# Patient Record
Sex: FEMALE | HISPANIC OR LATINO | URBAN - METROPOLITAN AREA
[De-identification: names, ages, dates, MRNs, and addresses within clinical notes are randomized per-mention and may not be internally consistent; named-entity substitution may affect disease eponyms.]

---

## 2021-08-23 ENCOUNTER — APPOINTMENT (RX ONLY)
Dept: URBAN - METROPOLITAN AREA CLINIC 12 | Facility: CLINIC | Age: 41
Setting detail: DERMATOLOGY
End: 2021-08-23

## 2021-08-23 DIAGNOSIS — Z41.1 ENCOUNTER FOR COSMETIC SURGERY: ICD-10-CM

## 2021-08-23 DIAGNOSIS — C49 MALIGNANT NEOPLASM OF OTHER CONNECTIVE AND SOFT TISSUE: ICD-10-CM

## 2021-08-23 PROBLEM — C49.9 MALIGNANT NEOPLASM OF CONNECTIVE AND SOFT TISSUE, UNSPECIFIED: Status: ACTIVE | Noted: 2021-08-23

## 2021-08-23 PROCEDURE — ? COUNSELING - LEOIMYOSARCOMA

## 2021-08-23 PROCEDURE — 99203 OFFICE O/P NEW LOW 30 MIN: CPT

## 2021-08-23 PROCEDURE — ? PHOTOS OBTAINED

## 2021-08-23 PROCEDURE — ? PATIENT SPECIFIC COUNSELING

## 2021-08-23 ASSESSMENT — LOCATION DETAILED DESCRIPTION DERM
LOCATION DETAILED: LEFT SUPRAPUBIC SKIN
LOCATION DETAILED: LEFT SUPRAPUBIC SKIN

## 2021-08-23 ASSESSMENT — LOCATION ZONE DERM
LOCATION ZONE: TRUNK
LOCATION ZONE: TRUNK

## 2021-08-23 ASSESSMENT — LOCATION SIMPLE DESCRIPTION DERM
LOCATION SIMPLE: GROIN
LOCATION SIMPLE: GROIN

## 2021-08-23 NOTE — PROCEDURE: PHOTOS OBTAINED
Photographed Locations: Photos were obtained of features
Follow-Up For Additional Photos?: yes
Detail Level: Detailed

## 2021-08-23 NOTE — PROCEDURE: PATIENT SPECIFIC COUNSELING
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 Patient was referred by Dr. Mike Talbot. Patient noticed a small bump about 1 year ago. 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 History of chemotherapy which was completed, patient will finish radiation therapy in September 2021. \\n\\Sheree Bishop examined patient and reports Sarcoma is larger, dimensions are 20cm x 20cm. explained he will coordinate with Dr. Talbot with the surgical procedure. Informed patient that he does not know exactly how he will perform the procedure until he sees the area of the deficit. Noted if Dr. Talbot removed Fascia and Muscle then he will need to use a mesh the reconstruct her abdomen. Dr. Bishop informed patients reconstruction will be on the same day as tumor removal in the O.R. Under general anesthesia. Dr. Bishop stepped out and Spoke to Dr. Talbot, reports needs to determine if he’s removing muscle and will review CT scan in a few weeks, noted surgery will take place in October, advised patient to follow up in mid-late sept to reevaluation for the closure, reconstruction and Pre-Op. patient verbalized understanding.
Detail Level: Simple

## 2021-08-23 NOTE — HPI: SOFT TISSUE SARCOMA
Is This A New Presentation, Or A Follow-Up?: Soft Tissue Sarcoma
Scale Of 0 To 10?: 0
Name Of General Surgeon?: DR. Mike Talbot

## 2021-09-20 ENCOUNTER — APPOINTMENT (RX ONLY)
Dept: URBAN - METROPOLITAN AREA CLINIC 12 | Facility: CLINIC | Age: 41
Setting detail: DERMATOLOGY
End: 2021-09-20

## 2021-09-20 DIAGNOSIS — C49 MALIGNANT NEOPLASM OF OTHER CONNECTIVE AND SOFT TISSUE: ICD-10-CM

## 2021-09-20 PROBLEM — C49.9 MALIGNANT NEOPLASM OF CONNECTIVE AND SOFT TISSUE, UNSPECIFIED: Status: ACTIVE | Noted: 2021-09-20

## 2021-09-20 PROCEDURE — ? PRE-OP WORKLIST

## 2021-09-20 PROCEDURE — 99214 OFFICE O/P EST MOD 30 MIN: CPT

## 2021-09-20 PROCEDURE — ? PATIENT SPECIFIC COUNSELING

## 2021-09-20 ASSESSMENT — LOCATION ZONE DERM
LOCATION ZONE: TRUNK
LOCATION ZONE: TRUNK

## 2021-09-20 ASSESSMENT — LOCATION SIMPLE DESCRIPTION DERM
LOCATION SIMPLE: GROIN
LOCATION SIMPLE: GROIN

## 2021-09-20 ASSESSMENT — LOCATION DETAILED DESCRIPTION DERM
LOCATION DETAILED: LEFT SUPRAPUBIC SKIN
LOCATION DETAILED: LEFT SUPRAPUBIC SKIN

## 2021-09-20 NOTE — PROCEDURE: PRE-OP WORKLIST
Surgeon: Dr. Bishop
Surgery Scheduled: Abdominal reconstruction with local flap +/- STSG
Date Of Surgery: TBD
Photos Taken?: yes
Detail Level: Simple

## 2021-09-20 NOTE — PROCEDURE: PATIENT SPECIFIC COUNSELING
Detail Level: Zone
Other (Free Text): Dr. Bishop stepped in evaluated the tumor explained that he may be able to use some of the loos skin on her abdomen, however, due to her tummy tuck there may be some restrictions. So the second option would be to take muscle from her thigh to help close the defect. Explained that he may be able to use the muscle from her inner thigh, however, there may not be enough muscle in that area to fill the defect.  explained that he will not know exactly what he will do until presented with the defect. Patient was made aware that she will go home with a wound vac. Patient will need to stay in the hospital 3-4 days

## 2021-10-19 ENCOUNTER — APPOINTMENT (RX ONLY)
Dept: URBAN - METROPOLITAN AREA CLINIC 12 | Facility: CLINIC | Age: 41
Setting detail: DERMATOLOGY
End: 2021-10-19

## 2021-10-19 DIAGNOSIS — Z48.89 ENCOUNTER FOR OTHER SPECIFIED SURGICAL AFTERCARE: ICD-10-CM

## 2021-10-19 PROCEDURE — 99024 POSTOP FOLLOW-UP VISIT: CPT

## 2021-10-19 PROCEDURE — ? DRESSING CHANGE

## 2021-10-19 PROCEDURE — ? POST-OP CHECK

## 2021-10-19 PROCEDURE — ? COUNSELING - POST-OP CHECK, COMPLEX WOUND RECONSTRUCTION

## 2021-10-19 PROCEDURE — ? PATIENT SPECIFIC COUNSELING

## 2021-10-19 ASSESSMENT — LOCATION SIMPLE DESCRIPTION DERM
LOCATION SIMPLE: GROIN
LOCATION SIMPLE: GROIN

## 2021-10-19 ASSESSMENT — LOCATION ZONE DERM
LOCATION ZONE: VULVA
LOCATION ZONE: VULVA

## 2021-10-19 ASSESSMENT — LOCATION DETAILED DESCRIPTION DERM
LOCATION DETAILED: MONS PUBIS
LOCATION DETAILED: MONS PUBIS

## 2021-10-19 NOTE — PROCEDURE: PATIENT SPECIFIC COUNSELING
Detail Level: Simple
Other (Free Text): Patient presents today for PostOp check for Wound Reconstruction from Sarcoma removal of lower abdomen and pubis area Performed on 10/12/21. Patient reports discomfort but healing well. Dr. Bishop examined patient, Bolster was removed and reports patient is healing well, no evidence of infection, bleeding, hematoma or Seroma. Drain on right side was removed today, instructed to apply ointment and bandaid to area once a day. Open wound along Pubis needs to be changed once a day with packing of xeroform, no-stick ABD pad and tape. Patient will be setup with [facility]. Dr. Bishop explained that would like patient to natural healin the open would without undergoing further surgery. Patient verbalized understanding and advised to follow up on Friday for suture and left drain removal.

## 2021-10-19 NOTE — PROCEDURE: POST-OP CHECK
Detail Level: Detailed
Add Postop Global No-Charge Code (78788)?: yes
Wound Evaluated By: Dr. Bishop

## 2021-10-22 ENCOUNTER — APPOINTMENT (RX ONLY)
Dept: URBAN - METROPOLITAN AREA CLINIC 12 | Facility: CLINIC | Age: 41
Setting detail: DERMATOLOGY
End: 2021-10-22

## 2021-10-22 DIAGNOSIS — Z48.89 ENCOUNTER FOR OTHER SPECIFIED SURGICAL AFTERCARE: ICD-10-CM

## 2021-10-22 PROCEDURE — 99024 POSTOP FOLLOW-UP VISIT: CPT

## 2021-10-22 PROCEDURE — ? DRAIN REMOVAL

## 2021-10-22 PROCEDURE — ? DRESSING CHANGE

## 2021-10-22 PROCEDURE — ? POST-OP CHECK

## 2021-10-22 PROCEDURE — ? COUNSELING - POST-OP CHECK, COMPLEX WOUND RECONSTRUCTION

## 2021-10-22 PROCEDURE — ? PATIENT SPECIFIC COUNSELING

## 2021-10-22 ASSESSMENT — LOCATION ZONE DERM
LOCATION ZONE: VULVA
LOCATION ZONE: VULVA
LOCATION ZONE: TRUNK

## 2021-10-22 ASSESSMENT — LOCATION SIMPLE DESCRIPTION DERM
LOCATION SIMPLE: GROIN
LOCATION SIMPLE: ABDOMEN
LOCATION SIMPLE: GROIN

## 2021-10-22 ASSESSMENT — LOCATION DETAILED DESCRIPTION DERM
LOCATION DETAILED: LEFT LATERAL ABDOMEN
LOCATION DETAILED: MONS PUBIS
LOCATION DETAILED: MONS PUBIS

## 2021-10-22 NOTE — PROCEDURE: POST-OP CHECK
Detail Level: Detailed
Add Postop Global No-Charge Code (19843)?: yes
Wound Evaluated By: Dr. Bishop

## 2021-10-22 NOTE — PROCEDURE: COUNSELING - POST-OP CHECK, COMPLEX WOUND RECONSTRUCTION
[facility] Department    [address]    [place] [address]    Phone:  [phone]                                       Yasmin Mazariegos   MRN: [id number]    Department:  [facility] Department   Date of Visit:  5/9/2018           After Visit Summary Signature Page     I have received my discharge instructions, and my questions have been answered. I have discussed any challenges I see with this plan with the nurse or doctor.    ..........................................................................................................................................  Patient/Patient Representative Signature      ..........................................................................................................................................  Patient Representative Print Name and Relationship to Patient    ..................................................               ................................................  Date                                            Time    ..........................................................................................................................................  Reviewed by Signature/Title    ...................................................              ..............................................  Date                                                            Time          
Add Postop Global No-Charge Code (58867)?: yes
Detail Level: Simple

## 2021-10-22 NOTE — PROCEDURE: PATIENT SPECIFIC COUNSELING
Detail Level: Simple
Other (Free Text): Patient presents today for PostOp check for Wound Reconstruction from Sarcoma removal of lower abdomen and pubis area Performed on 10/12/21. Patient reports doing well, however, states she no longer wants to go to [facility] wound care. Patient states it’s to expensive for her and would like to continue wound care at home. Dr. Bishop agreed, advised that she start applying silagen powder, and  xeroform everyday until she returns in 2 weeks. Patient verbalized understanding. \\n\\nPatients drain was removed today. And her wound was redressed with Stimulen and xeroform.

## 2021-10-27 ENCOUNTER — RX ONLY (OUTPATIENT)
Age: 41
Setting detail: RX ONLY
End: 2021-10-27

## 2021-10-27 ENCOUNTER — APPOINTMENT (RX ONLY)
Dept: URBAN - METROPOLITAN AREA CLINIC 12 | Facility: CLINIC | Age: 41
Setting detail: DERMATOLOGY
End: 2021-10-27

## 2021-10-27 DIAGNOSIS — Z48.89 ENCOUNTER FOR OTHER SPECIFIED SURGICAL AFTERCARE: ICD-10-CM

## 2021-10-27 DIAGNOSIS — T81.4 INFECTION FOLLOWING A PROCEDURE: ICD-10-CM

## 2021-10-27 PROBLEM — T81.42XA INFECTION FOLLOWING A PROCEDURE, DEEP INCISIONAL SURGICAL SITE, INITIAL ENCOUNTER: Status: ACTIVE | Noted: 2021-10-27

## 2021-10-27 PROCEDURE — ? POST-OP CHECK

## 2021-10-27 PROCEDURE — ? COUNSELING - INFECTION

## 2021-10-27 PROCEDURE — ? DRESSING CHANGE

## 2021-10-27 PROCEDURE — ? PATIENT SPECIFIC COUNSELING

## 2021-10-27 PROCEDURE — 99024 POSTOP FOLLOW-UP VISIT: CPT

## 2021-10-27 PROCEDURE — ? COUNSELING - POST-OP CHECK, COMPLEX WOUND RECONSTRUCTION

## 2021-10-27 RX ORDER — SULFAMETHOXAZOLE AND TRIMETHOPRIM 400; 80 MG/1; MG/1
TABLET ORAL
Qty: 28 | Refills: 0 | COMMUNITY
Start: 2021-10-27

## 2021-10-27 RX ORDER — CIPROFLOXACIN 500 MG/5ML
KIT ORAL
Qty: 28 | Refills: 0 | COMMUNITY
Start: 2021-10-27

## 2021-10-27 RX ORDER — SODIUM HYPOCHLORITE 1.25 MG/ML
SOLUTION TOPICAL
Qty: 2 | Refills: 1 | COMMUNITY
Start: 2021-10-27

## 2021-10-27 ASSESSMENT — LOCATION ZONE DERM
LOCATION ZONE: TRUNK
LOCATION ZONE: TRUNK
LOCATION ZONE: VULVA

## 2021-10-27 ASSESSMENT — LOCATION SIMPLE DESCRIPTION DERM
LOCATION SIMPLE: GROIN
LOCATION SIMPLE: GROIN

## 2021-10-27 ASSESSMENT — LOCATION DETAILED DESCRIPTION DERM
LOCATION DETAILED: RIGHT SUPRAPUBIC SKIN
LOCATION DETAILED: LEFT SUPRAPUBIC SKIN
LOCATION DETAILED: RIGHT SUPRAPUBIC SKIN
LOCATION DETAILED: MONS PUBIS
LOCATION DETAILED: LEFT SUPRAPUBIC SKIN

## 2021-10-27 NOTE — PROCEDURE: PATIENT SPECIFIC COUNSELING
Detail Level: Simple
Other (Free Text): Patient presents today for PostOp check for Wound Reconstruction from Sarcoma removal of lower abdomen and pubis area Performed on 10/12/21. Patient c/o a bad odor coming from the surgical site. \\n\\nDr. Dario stepped in, explained that she does have an infection in the wound. Patient was strongly advised to continue seeing wound care as she requested to stop and continue her own care from home. Patient was made aware that wound care will be able to help keep the wound clean, and clean out the infection. Patient was placed on two oral antibiotics. Ciprofloxacin and Bactrim, patient was also prescribed Dakins solution as well. A new wound care order was submitted today. Patient was strongly encouraged to give them a call to get scheduled for tomorrow. Patient verbalized understanding.

## 2021-11-05 ENCOUNTER — APPOINTMENT (RX ONLY)
Dept: URBAN - METROPOLITAN AREA CLINIC 12 | Facility: CLINIC | Age: 41
Setting detail: DERMATOLOGY
End: 2021-11-05

## 2021-11-05 DIAGNOSIS — T81.4 INFECTION FOLLOWING A PROCEDURE: ICD-10-CM

## 2021-11-05 DIAGNOSIS — Z48.89 ENCOUNTER FOR OTHER SPECIFIED SURGICAL AFTERCARE: ICD-10-CM

## 2021-11-05 PROBLEM — T81.42XA INFECTION FOLLOWING A PROCEDURE, DEEP INCISIONAL SURGICAL SITE, INITIAL ENCOUNTER: Status: ACTIVE | Noted: 2021-11-05

## 2021-11-05 PROCEDURE — ? POST-OP CHECK

## 2021-11-05 PROCEDURE — ? DRESSING CHANGE

## 2021-11-05 PROCEDURE — ? COUNSELING - POST-OP CHECK, COMPLEX WOUND RECONSTRUCTION

## 2021-11-05 PROCEDURE — ? PATIENT SPECIFIC COUNSELING

## 2021-11-05 PROCEDURE — ? COUNSELING - INFECTION

## 2021-11-05 PROCEDURE — 99024 POSTOP FOLLOW-UP VISIT: CPT

## 2021-11-05 ASSESSMENT — LOCATION DETAILED DESCRIPTION DERM
LOCATION DETAILED: RIGHT SUPRAPUBIC SKIN
LOCATION DETAILED: MONS PUBIS
LOCATION DETAILED: LEFT SUPRAPUBIC SKIN
LOCATION DETAILED: RIGHT SUPRAPUBIC SKIN
LOCATION DETAILED: LEFT SUPRAPUBIC SKIN

## 2021-11-05 ASSESSMENT — LOCATION ZONE DERM
LOCATION ZONE: VULVA
LOCATION ZONE: TRUNK
LOCATION ZONE: TRUNK

## 2021-11-05 ASSESSMENT — LOCATION SIMPLE DESCRIPTION DERM
LOCATION SIMPLE: GROIN
LOCATION SIMPLE: GROIN

## 2021-11-05 NOTE — PROCEDURE: PATIENT SPECIFIC COUNSELING
Detail Level: Simple
Other (Free Text): Patient presents today for PostOp check for Wound Reconstruction from Sarcoma removal of lower abdomen and pubis area Performed on 10/12/21. Patient states that a bad odor is coming from the surgical site. She said she’s been taking her antibiotics and using the Rosendo solution, however she hasn’t been to wound care yet. \\Sheree Bishop stepped in and voiced that the patient will have to me admitted to the hospital as soon as possible. There her wound will be properly taken care of and she will be given an IV drip with antibiotics. Then, sometime next week Dr. Bishop will perform an operation in which a skin graft from the thigh will be placed in the pubic area to close the wound. He stated that this is the best option since the patient has an open, infected wound and because she didn’t go to wound care like she was previously told to do. Her total length of stay in the hospital is uncertain, it depends on getting the infection under control. \\nPatient verbalized understanding and is to RtC after her procedure.

## 2021-12-13 ENCOUNTER — APPOINTMENT (RX ONLY)
Dept: URBAN - METROPOLITAN AREA CLINIC 12 | Facility: CLINIC | Age: 41
Setting detail: DERMATOLOGY
End: 2021-12-13

## 2021-12-13 DIAGNOSIS — Z48.89 ENCOUNTER FOR OTHER SPECIFIED SURGICAL AFTERCARE: ICD-10-CM

## 2021-12-13 PROCEDURE — ? POST-OP CHECK

## 2021-12-13 PROCEDURE — ? PATIENT SPECIFIC COUNSELING

## 2021-12-13 PROCEDURE — 99024 POSTOP FOLLOW-UP VISIT: CPT

## 2021-12-13 PROCEDURE — ? COUNSELING - POST-OP CHECK, COMPLEX WOUND RECONSTRUCTION

## 2021-12-13 ASSESSMENT — LOCATION DETAILED DESCRIPTION DERM
LOCATION DETAILED: RIGHT SUPRAPUBIC SKIN
LOCATION DETAILED: RIGHT SUPRAPUBIC SKIN

## 2021-12-13 ASSESSMENT — LOCATION SIMPLE DESCRIPTION DERM
LOCATION SIMPLE: GROIN
LOCATION SIMPLE: GROIN

## 2021-12-13 ASSESSMENT — LOCATION ZONE DERM
LOCATION ZONE: TRUNK
LOCATION ZONE: TRUNK

## 2021-12-13 NOTE — PROCEDURE: PATIENT SPECIFIC COUNSELING
Detail Level: Simple
Other (Free Text): Patient presents today (with  via phone) for PostOp check from secondary Closure. Patient voices no issues with healing or pain, patient will see Wound Care Specialist on Mondays and Tuesday. Dr. Bishop spoke to wound Care Specialist and the report is very good, upon examination, noted no signs of infection or bleeding, dimensions (8 3/4 x 4) wound looks healthy and clean. Dr. Bishop states patient will continue with the Wound Vac for at least one more month. Patient dressing was changed today. Dr. Bishop explained that depending on how well the wound shrinks in size, will determine if patient would need a minor surgery in the future or no surgery at all. Patient verbalized understanding and advised to Follow up in month.

## 2021-12-13 NOTE — PROCEDURE: POST-OP CHECK
Detail Level: Detailed
Add Postop Global No-Charge Code (73106)?: yes
Wound Evaluated By: Dr. Bishop

## 2022-01-07 ENCOUNTER — APPOINTMENT (RX ONLY)
Dept: URBAN - METROPOLITAN AREA CLINIC 12 | Facility: CLINIC | Age: 42
Setting detail: DERMATOLOGY
End: 2022-01-07

## 2022-01-07 DIAGNOSIS — S31000A OPEN WOUND(S) (MULTIPLE) OF UNSPECIFIED SITE(S), WITHOUT MENTION OF COMPLICATION: ICD-10-CM

## 2022-01-07 DIAGNOSIS — Z48.89 ENCOUNTER FOR OTHER SPECIFIED SURGICAL AFTERCARE: ICD-10-CM

## 2022-01-07 PROBLEM — S31.109A UNSPECIFIED OPEN WOUND OF ABDOMINAL WALL, UNSPECIFIED QUADRANT WITHOUT PENETRATION INTO PERITONEAL CAVITY, INITIAL ENCOUNTER: Status: ACTIVE | Noted: 2022-01-07

## 2022-01-07 PROCEDURE — ? POST-OP CHECK

## 2022-01-07 PROCEDURE — ? COUNSELING - POST-OP CHECK, COMPLEX WOUND RECONSTRUCTION

## 2022-01-07 PROCEDURE — ? PATIENT SPECIFIC COUNSELING

## 2022-01-07 PROCEDURE — ? PRESCRIPTION

## 2022-01-07 PROCEDURE — ? ORDER TESTS

## 2022-01-07 PROCEDURE — 99024 POSTOP FOLLOW-UP VISIT: CPT

## 2022-01-07 RX ORDER — SULFAMETHOXAZOLE AND TRIMETHOPRIM 800; 160 MG/1; MG/1
TABLET ORAL
Qty: 14 | Refills: 0 | Status: ERX | COMMUNITY
Start: 2022-01-07

## 2022-01-07 RX ORDER — CIPROFLOXACIN HYDROCHLORIDE 500 MG/1
TABLET, FILM COATED ORAL
Qty: 14 | Refills: 0 | Status: ERX | COMMUNITY
Start: 2022-01-07

## 2022-01-07 RX ADMIN — CIPROFLOXACIN HYDROCHLORIDE: 500 TABLET, FILM COATED ORAL at 00:00

## 2022-01-07 RX ADMIN — SULFAMETHOXAZOLE AND TRIMETHOPRIM: 800; 160 TABLET ORAL at 00:00

## 2022-01-07 ASSESSMENT — LOCATION DETAILED DESCRIPTION DERM
LOCATION DETAILED: UMBILICUS
LOCATION DETAILED: RIGHT SUPRAPUBIC SKIN
LOCATION DETAILED: RIGHT SUPRAPUBIC SKIN
LOCATION DETAILED: UMBILICUS

## 2022-01-07 ASSESSMENT — LOCATION ZONE DERM
LOCATION ZONE: TRUNK
LOCATION ZONE: TRUNK

## 2022-01-07 ASSESSMENT — LOCATION SIMPLE DESCRIPTION DERM
LOCATION SIMPLE: GROIN
LOCATION SIMPLE: ABDOMEN
LOCATION SIMPLE: ABDOMEN
LOCATION SIMPLE: GROIN

## 2022-01-07 NOTE — PROCEDURE: COUNSELING - POST-OP CHECK, COMPLEX WOUND RECONSTRUCTION
Detail Level: Simple
Add Postop Global No-Charge Code (34310)?: yes
<<----- Click to add NO significant Past Surgical History

## 2022-01-07 NOTE — PROCEDURE: ORDER TESTS
Performing Laboratory: 0
Expected Date Of Service: 01/07/2022
Billing Type: Third-Party Bill
Bill For Surgical Tray: no

## 2022-01-07 NOTE — PROCEDURE: POST-OP CHECK
Detail Level: Detailed
Add Postop Global No-Charge Code (11647)?: yes
Wound Evaluated By: Dr. Bishop

## 2022-01-07 NOTE — PROCEDURE: PATIENT SPECIFIC COUNSELING
Detail Level: Simple
Other (Free Text): Patient presents today (with  via phone) for PostOp check from secondary Closure. Patient voices no issues pain, states tape from WoundVac bandage irritated her skin, area has subsided. Dr. Bishop examined patient and reports no signs of infection, wound is clean and dry and healing well, dimensions (8cm x 3cm) Dr. Bishop states patient no longer needs to continue with the Wound Vac for now, due to irritation, fluid was removed today from belly button and sent to patient, explained patient needs to continue draining fluid and applying a damage to area. Continue to see wound care clinic 2x week until wound has completely healed, patient was given antibiotics for belly button infection, advised to follow up with wound care on Monday. Patient verbalized understanding and advised to RTC in 2 weeks

## 2022-01-21 ENCOUNTER — APPOINTMENT (RX ONLY)
Dept: URBAN - METROPOLITAN AREA CLINIC 12 | Facility: CLINIC | Age: 42
Setting detail: DERMATOLOGY
End: 2022-01-21

## 2022-01-21 DIAGNOSIS — Z48.89 ENCOUNTER FOR OTHER SPECIFIED SURGICAL AFTERCARE: ICD-10-CM

## 2022-01-21 DIAGNOSIS — S31000A OPEN WOUND(S) (MULTIPLE) OF UNSPECIFIED SITE(S), WITHOUT MENTION OF COMPLICATION: ICD-10-CM

## 2022-01-21 PROBLEM — S31.109A UNSPECIFIED OPEN WOUND OF ABDOMINAL WALL, UNSPECIFIED QUADRANT WITHOUT PENETRATION INTO PERITONEAL CAVITY, INITIAL ENCOUNTER: Status: ACTIVE | Noted: 2022-01-21

## 2022-01-21 PROCEDURE — ? PATIENT SPECIFIC COUNSELING

## 2022-01-21 PROCEDURE — ? ORDER TESTS

## 2022-01-21 PROCEDURE — ? POST-OP CHECK

## 2022-01-21 PROCEDURE — ? COUNSELING - POST-OP CHECK, COMPLEX WOUND RECONSTRUCTION

## 2022-01-21 PROCEDURE — 99024 POSTOP FOLLOW-UP VISIT: CPT

## 2022-01-21 ASSESSMENT — LOCATION DETAILED DESCRIPTION DERM
LOCATION DETAILED: RIGHT SUPRAPUBIC SKIN
LOCATION DETAILED: RIGHT SUPRAPUBIC SKIN
LOCATION DETAILED: UMBILICUS
LOCATION DETAILED: UMBILICUS

## 2022-01-21 ASSESSMENT — LOCATION ZONE DERM
LOCATION ZONE: TRUNK
LOCATION ZONE: TRUNK

## 2022-01-21 ASSESSMENT — LOCATION SIMPLE DESCRIPTION DERM
LOCATION SIMPLE: ABDOMEN
LOCATION SIMPLE: GROIN
LOCATION SIMPLE: ABDOMEN
LOCATION SIMPLE: GROIN

## 2022-01-21 NOTE — PROCEDURE: PATIENT SPECIFIC COUNSELING
Detail Level: Simple
Other (Free Text): Patient presents today (with  via phone) for PostOp check from secondary Closure. Patient voices no issues pain, \\n\\nDr. Dario stepped in, voiced that her wound is almost healed , no surgery needed at this point. Advised continue visits with wound care, and follow up in 1 month. Advised against getting into a pool during her vacation, however she may swim in the ocean. Patient verbalized understanding.

## 2022-01-21 NOTE — PROCEDURE: POST-OP CHECK
Detail Level: Detailed
Add Postop Global No-Charge Code (31955)?: yes
Wound Evaluated By: Dr. Bishop